# Patient Record
Sex: MALE | Race: OTHER | HISPANIC OR LATINO | Employment: FULL TIME | URBAN - NONMETROPOLITAN AREA
[De-identification: names, ages, dates, MRNs, and addresses within clinical notes are randomized per-mention and may not be internally consistent; named-entity substitution may affect disease eponyms.]

---

## 2023-04-15 ENCOUNTER — OFFICE VISIT (OUTPATIENT)
Dept: URGENT CARE | Facility: PHYSICIAN GROUP | Age: 43
End: 2023-04-15
Payer: COMMERCIAL

## 2023-04-15 ENCOUNTER — HOSPITAL ENCOUNTER (OUTPATIENT)
Facility: MEDICAL CENTER | Age: 43
End: 2023-04-15
Attending: NURSE PRACTITIONER
Payer: COMMERCIAL

## 2023-04-15 VITALS
TEMPERATURE: 97.8 F | BODY MASS INDEX: 38.7 KG/M2 | SYSTOLIC BLOOD PRESSURE: 122 MMHG | RESPIRATION RATE: 18 BRPM | DIASTOLIC BLOOD PRESSURE: 84 MMHG | WEIGHT: 292 LBS | OXYGEN SATURATION: 97 % | HEART RATE: 70 BPM | HEIGHT: 73 IN

## 2023-04-15 DIAGNOSIS — K13.70 ORAL LESION: ICD-10-CM

## 2023-04-15 DIAGNOSIS — J02.9 PHARYNGITIS, UNSPECIFIED ETIOLOGY: ICD-10-CM

## 2023-04-15 LAB — S PYO DNA SPEC NAA+PROBE: NOT DETECTED

## 2023-04-15 PROCEDURE — 87077 CULTURE AEROBIC IDENTIFY: CPT

## 2023-04-15 PROCEDURE — 87254 VIRUS INOCULATION SHELL VIA: CPT

## 2023-04-15 PROCEDURE — 87651 STREP A DNA AMP PROBE: CPT | Performed by: NURSE PRACTITIONER

## 2023-04-15 PROCEDURE — 87070 CULTURE OTHR SPECIMN AEROBIC: CPT

## 2023-04-15 PROCEDURE — 87252 VIRUS INOCULATION TISSUE: CPT

## 2023-04-15 PROCEDURE — 99203 OFFICE O/P NEW LOW 30 MIN: CPT | Performed by: NURSE PRACTITIONER

## 2023-04-15 RX ORDER — LIDOCAINE HYDROCHLORIDE 20 MG/ML
5 SOLUTION OROPHARYNGEAL PRN
Qty: 100 ML | Refills: 0 | Status: SHIPPED | OUTPATIENT
Start: 2023-04-15 | End: 2023-04-25

## 2023-04-15 ASSESSMENT — ENCOUNTER SYMPTOMS
CHILLS: 0
FEVER: 0

## 2023-04-16 NOTE — PROGRESS NOTES
"Subjective:     Rey Hays is a 43 y.o. male who presents for Bump (X 1 wk, pt states he has these rash type bumps on the side of his tongue and on the inside of his lips)      HPI  Pt presents for evaluation of a new problem. Rey is a very pleasant 43-year-old male presents to urgent care today with complaints of a painful rash of his oral mucosa.  He states that his symptoms have been ongoing for approximately 1 week.  He notes a burning pain that initially started on his inner lip and is now progressed to the roof of his mouth and posterior pharynx.  He notes a burning pain with all liquids and food that he eats.  He notes that his daughter also had similar symptoms that have not been ongoing for the past 2 weeks.  He has not used any treatment for his symptoms.  No other rashes located on his body.  He denies any pain with swallowing.     Review of Systems   Constitutional:  Negative for chills and fever.     PMH: History reviewed. No pertinent past medical history.  ALLERGIES: Not on File  SURGHX: History reviewed. No pertinent surgical history.  SOCHX:   Social History     Socioeconomic History    Marital status:      FH: History reviewed. No pertinent family history.      Objective:   /84   Pulse 70   Temp 36.6 °C (97.8 °F) (Temporal)   Resp 18   Ht 1.854 m (6' 1\")   Wt (!) 132 kg (292 lb)   SpO2 97%   BMI 38.52 kg/m²     Physical Exam  Vitals and nursing note reviewed.   Constitutional:       General: He is not in acute distress.     Appearance: Normal appearance. He is normal weight. He is not ill-appearing or toxic-appearing.   HENT:      Head: Normocephalic.      Right Ear: External ear normal.      Left Ear: External ear normal.      Nose: Nose normal.      Mouth/Throat:      Mouth: Mucous membranes are moist.   Eyes:      General:         Right eye: No discharge.         Left eye: No discharge.      Extraocular Movements: Extraocular movements intact.      Conjunctiva/sclera: " Conjunctivae normal.      Pupils: Pupils are equal, round, and reactive to light.   Cardiovascular:      Rate and Rhythm: Normal rate and regular rhythm.   Pulmonary:      Effort: Pulmonary effort is normal.      Breath sounds: Normal breath sounds.   Abdominal:      General: Abdomen is flat.   Musculoskeletal:         General: Normal range of motion.      Cervical back: Normal range of motion and neck supple. No rigidity.   Lymphadenopathy:      Cervical: No cervical adenopathy.   Skin:     General: Skin is warm and dry.      Comments: Erythemic lacy rash present to oral mucosa and hard palate.  Negative for exudate.  Uvula is midline.   Neurological:      General: No focal deficit present.      Mental Status: He is alert and oriented to person, place, and time. Mental status is at baseline.   Psychiatric:         Mood and Affect: Mood normal.         Behavior: Behavior normal.         Judgment: Judgment normal.     POCT Cepheid strep: Negative  Assessment/Plan:   Assessment    1. Pharyngitis, unspecified etiology  lidocaine (XYLOCAINE) 2 % Solution    POCT GROUP A STREP, PCR    CULTURE THROAT    Viral Culture, Rapid, Lesion    CANCELED: Viral Culture, Rapid, Lesion      2. Oral lesion  CULTURE THROAT    Viral Culture, Rapid, Lesion    CANCELED: Viral Culture, Rapid, Lesion        Strep test was negative in clinic today.  Viral and bacterial culture sent for analysis..  Patient was given 10 mg dexamethasone orally in clinic for relief of swelling and discomfort.  Additionally, I did send lidocaine and educated him to mix 5 mL of lidocaine, 5 mL of Maalox and 5% of children's liquid Benadryl to make Magic mouthwash mixture.  I will notify him of results.  He is in agreement with plan of care today.  AVS handout given and reviewed with patient. Pt educated on red flags and when to seek treatment back in ER or UC.

## 2023-04-17 DIAGNOSIS — K13.70 ORAL LESION: ICD-10-CM

## 2023-04-17 DIAGNOSIS — J02.9 PHARYNGITIS, UNSPECIFIED ETIOLOGY: ICD-10-CM

## 2023-04-20 LAB
BACTERIA SPEC RESP CULT: NORMAL
SIGNIFICANT IND 70042: NORMAL
SITE SITE: NORMAL
SOURCE SOURCE: NORMAL

## 2023-05-01 LAB — TEST NAME 95000: NORMAL
